# Patient Record
(demographics unavailable — no encounter records)

---

## 2024-12-04 NOTE — HISTORY OF PRESENT ILLNESS
[de-identified] : Kamala Orellana is a 67-year-old F who presents for evaluation of possible renal cancer discovered on imaging during hospitalization at [facility]. At discharge, she was anemic with HGB=8.3 g/dL.  At time of initial consult, she states she feels unchanged since discharge, with continued fatigue and nausea. Reports that she has Zofran for her nausea, which is providing relief. Denies any pain or further weight loss.  Following up w/ Dr. Wayne next week and will be scheduling an appointment w/ Dr. Isak powell.  Employed as  at [employer].   11/25/24 US Abd: 1. Suspicious LEFT renal, LEFT pelvic and hepatic lesions. Metastatic malignancy suspected. Primary evaluation with CT recommended 2. Nonocclusive IVC thrombus  11/25/24 CT C/A/P: 1. Large superior exophytic mid/upper pole LEFT renal malignancy with nonocclusive LEFT renal vein/IVC thrombus. 2. Hepatic and pulmonary lesions are most suspicious for metastasis 3. Bilateral large ovarian cystic lesions, measure up to 16 cm. Benign ovarian neoplasm cannot be distinguished from malignant ovarian neoplasm 4. Giant hepatic cyst  11/25/24 Transvaginal US:  - Cyst in the midline of the pelvis measuring 16.7 cm with areas of nodularity along the periphery of the cyst and along an internal septation consistent with an ovarian neoplasm. - 13.6 cm in the right adnexa with an internal septation and debris, also consistent with a neoplasm. - Uterus not seen. - A pre and post IV contrast MRI of the pelvis could be obtained for further evaluation.  11/26/24 MR Pelvis: Large bilateral adnexal cystic masses with mural nodularity, suspicious for malignant cystic ovarian neoplasms.  11/29/24 Surgical Pathology: 1) Liver mass core biopsy: - Small, single fragment of highly vascular tissue with numerous thin-walled capillaries, possibly compatible with the clinical suspicion of hemangioma in the clinical setting of a mass and the intraoperative finding of abundant blood on smears. - Separate fragments of benign, unremarkable liver and fibroconnective tissue. - No epithelial malignancy identified in sampled tissue. *Note: Reticulin stain with adequate control shows liver tissue with an intact reticulin network.

## 2024-12-04 NOTE — HISTORY OF PRESENT ILLNESS
[de-identified] : Kamala Orellana is a 67-year-old F who presents for evaluation of possible renal cancer discovered on imaging during hospitalization at [facility]. At discharge, she was anemic with HGB=8.3 g/dL.  At time of initial consult, she states she feels unchanged since discharge, with continued fatigue and nausea. Reports that she has Zofran for her nausea, which is providing relief. Denies any pain or further weight loss.  Following up w/ Dr. Wayne next week and will be scheduling an appointment w/ Dr. Isak powell.  Employed as  at [employer].   11/25/24 US Abd: 1. Suspicious LEFT renal, LEFT pelvic and hepatic lesions. Metastatic malignancy suspected. Primary evaluation with CT recommended 2. Nonocclusive IVC thrombus  11/25/24 CT C/A/P: 1. Large superior exophytic mid/upper pole LEFT renal malignancy with nonocclusive LEFT renal vein/IVC thrombus. 2. Hepatic and pulmonary lesions are most suspicious for metastasis 3. Bilateral large ovarian cystic lesions, measure up to 16 cm. Benign ovarian neoplasm cannot be distinguished from malignant ovarian neoplasm 4. Giant hepatic cyst  11/25/24 Transvaginal US:  - Cyst in the midline of the pelvis measuring 16.7 cm with areas of nodularity along the periphery of the cyst and along an internal septation consistent with an ovarian neoplasm. - 13.6 cm in the right adnexa with an internal septation and debris, also consistent with a neoplasm. - Uterus not seen. - A pre and post IV contrast MRI of the pelvis could be obtained for further evaluation.  11/26/24 MR Pelvis: Large bilateral adnexal cystic masses with mural nodularity, suspicious for malignant cystic ovarian neoplasms.  11/29/24 Surgical Pathology: 1) Liver mass core biopsy: - Small, single fragment of highly vascular tissue with numerous thin-walled capillaries, possibly compatible with the clinical suspicion of hemangioma in the clinical setting of a mass and the intraoperative finding of abundant blood on smears. - Separate fragments of benign, unremarkable liver and fibroconnective tissue. - No epithelial malignancy identified in sampled tissue. *Note: Reticulin stain with adequate control shows liver tissue with an intact reticulin network.

## 2024-12-04 NOTE — ADDENDUM
[FreeTextEntry1] : All medical record entries made by the Scribe were at my, Dr. Norbert Nelson, direction and personally dictated by me on 12/03/2024. I have reviewed the chart and agree that the record accurately reflects my personal performance of the history, physical exam, assessment and plan. I have also personally directed, reviewed, and agreed with the chart.   I, Chandu Curtis, documented this note as a scribe on behalf of Dr. Norbert Nelson on 12/03/2024.

## 2024-12-04 NOTE — RESULTS/DATA
[FreeTextEntry1] : Kamala Orellana is a 67-year-old F who presents for evaluation of possible renal cancer discovered on imaging during hospitalization at [facility]

## 2024-12-04 NOTE — PHYSICAL EXAM
[Normal] : affect appropriate [Fully active, able to carry on all pre-disease performance without restriction] : Status 0 - Fully active, able to carry on all pre-disease performance without restriction [de-identified] : anicteric [de-identified] : distended abdomen

## 2024-12-04 NOTE — PHYSICAL EXAM
[Normal] : affect appropriate [Fully active, able to carry on all pre-disease performance without restriction] : Status 0 - Fully active, able to carry on all pre-disease performance without restriction [de-identified] : anicteric [de-identified] : distended abdomen

## 2024-12-18 NOTE — ASSESSMENT
[FreeTextEntry1] : Discussed in Tumor Board as well (12/11/24):  plan:  MRI abdomen w wo Gado - extent of thrombus / involvement of renal vein/IVBC -- [place] vs tumor thrombus Will discuss plan with Dr Parisi - Gyne Onc Will need joint surgery with Dr Wakefield at  continue Eliquis 5 mg BID  referrals sent

## 2024-12-18 NOTE — HISTORY OF PRESENT ILLNESS
[FreeTextEntry1] : Hosp consult follow-up:  HPI 67 year old postmenopausal female with PMH HLD, x3  sections, and right ankle ORIF surgery comes in after outpatient US ordered by her PCP showed IVC thrombus and was sent to the ED. She states for the past few months she has been having fatigue, unexpected 20-22 lb weight loss, constant and mild nausea, decreased appetite, and excessive belching for the past 6-7 weeks, so her PCP sent her for outpatient US. Denies dyspnea, fever, night sweats, easy bleeding/bruising, abdominal pain, hematochezia/melena, urinary frequency/urgency, hematuria, CVA tenderness, or abnormal uterine bleeding  PET FDG scan:  Findings consistent with left renal malignancy and left renal vein thrombus. Multiple bilateral FDG avid lung nodules, concerning for metastases. Large cystic pelvic lesions, better characterized on MR, one with mild peripheral FDG avidity. Excreted urine in the urinary bladder surrounding the cystic masses, with unusual configurations, as above. MR provides most complete anatomic detail in these regions. No FDG avid liver lesions.   Discussed PET with patient and Dr sarmiento -- due to small size of lung nodules - will hold off on lung biopsy

## 2024-12-18 NOTE — ASSESSMENT
[FreeTextEntry1] : Discussed in Tumor Board as well (12/11/24):  plan:  MRI abdomen w wo Gado - extent of thrombus / involvement of renal vein/IVBC -- [place] vs tumor thrombus Will discuss plan with Dr Parisi - Gyne Onc Will need joint surgery with Dr Wakeifeld at  continue Eliquis 5 mg BID  referrals sent

## 2024-12-30 NOTE — HISTORY OF PRESENT ILLNESS
[FreeTextEntry1] : 66 yo female presenting with abdominal pain and found to have a large left renal mass (14cm) with left renal vein thrombus along with FDG avid lung nodules and large pelvic cystic lesions concerning for malignancy on ultrasound.  She has seen Dr. Parisi (gyn-onc in [place]) plans to do a pelvic mass removal/hysterectomy after nephrectomy is performed. Patient presented with pain around thanksgiving and was seen by Dr Wayne.    her case was presented in tumor board.   She has seen a gyn onc but the doctor is out of the [facility] system Here for opinion about a radical nephrectomy

## 2024-12-30 NOTE — ASSESSMENT
[FreeTextEntry1] : I have personally reviewed extensively all images including the original CT scan, PET scan and the recent MRI She has possible lung mets on the PET scan and also her renal vein thrombus extends to the IVC, the full length of the vein but is not in the IVC.   The ovarian lesions are very large and cystic occupying the pelvic region and has been told by GYN ONc that she needs a radical hysterectomy for suspected malignancy.    I have not had the opportunity to review her notes as the doctor is not a [facility] doctor.  I had a long discussion with the patient and her .   I believe that her care should be at [facility] in a multidisciplinary fashion with GYN ONC, Urology and possible vascular surgery.   I have spoken to Dr Abel whose team will contact the patient for expedited care.    There is a possibility that the procedures can be done at the same time if her renal operation goes uneventfully.     But she needs this care delivered at a tertiary care center in my judgement.  Referral arranged

## 2025-01-08 NOTE — PHYSICAL EXAM
[Fully active, able to carry on all pre-disease performance without restriction] : Status 0 - Fully active, able to carry on all pre-disease performance without restriction [Normal] : affect appropriate [de-identified] : anicteric [de-identified] : distended abdomen

## 2025-01-08 NOTE — ADDENDUM
[FreeTextEntry1] : All medical record entries made by the Scribe were at my, Dr. Norbert Nelson, direction and personally dictated by me on 01/08/2025. I have reviewed the chart and agree that the record accurately reflects my personal performance of the history, physical exam, assessment and plan. I have also personally directed, reviewed, and agreed with the chart.   I, Chandu Curtis, documented this note as a scribe on behalf of Dr. Norbert Nelson on 01/08/2025.

## 2025-01-08 NOTE — HISTORY OF PRESENT ILLNESS
[de-identified] : Kamala Orellana is a 67-year-old F who presents for evaluation of possible renal cancer discovered on imaging during hospitalization at [facility]. At discharge, she was anemic with HGB=8.3 g/dL.  At time of initial consult, she states she feels unchanged since discharge, with continued fatigue and nausea. Reports that she has Zofran for her nausea, which is providing relief. Denies any pain or further weight loss.  Following up w/ Dr. Wayne next week and will be scheduling an appointment w/ Dr. Isak powell.  Employed as  at [employer].   11/25/24 US Abd: 1. Suspicious LEFT renal, LEFT pelvic and hepatic lesions. Metastatic malignancy suspected. Primary evaluation with CT recommended 2. Nonocclusive IVC thrombus  11/25/24 CT C/A/P: 1. Large superior exophytic mid/upper pole LEFT renal malignancy with nonocclusive LEFT renal vein/IVC thrombus. 2. Hepatic and pulmonary lesions are most suspicious for metastasis 3. Bilateral large ovarian cystic lesions, measure up to 16 cm. Benign ovarian neoplasm cannot be distinguished from malignant ovarian neoplasm 4. Giant hepatic cyst  11/25/24 Transvaginal US:  - Cyst in the midline of the pelvis measuring 16.7 cm with areas of nodularity along the periphery of the cyst and along an internal septation consistent with an ovarian neoplasm. - 13.6 cm in the right adnexa with an internal septation and debris, also consistent with a neoplasm. - Uterus not seen. - A pre and post IV contrast MRI of the pelvis could be obtained for further evaluation.  11/26/24 MR Pelvis: Large bilateral adnexal cystic masses with mural nodularity, suspicious for malignant cystic ovarian neoplasms.  11/29/24 Surgical Pathology: 1) Liver mass core biopsy: - Small, single fragment of highly vascular tissue with numerous thin-walled capillaries, possibly compatible with the clinical suspicion of hemangioma in the clinical setting of a mass and the intraoperative finding of abundant blood on smears. - Separate fragments of benign, unremarkable liver and fibroconnective tissue. - No epithelial malignancy identified in sampled tissue. *Note: Reticulin stain with adequate control shows liver tissue with an intact reticulin network. [de-identified] : She returns today for a follow up. Scheduled for joint surgery performed by Dr. Abel and Dr. Sherwood at [facility] on 1/16/25:  Left Radical Nephrectomy and radical hysterectomy. Now on Eliquis or L renal vein thrombus as well. Denies any recent or active bleeding, including blood in stool.  12/10/25 PET/CT: 1. Findings consistent with left renal malignancy and left renal vein thrombus. 2. Multiple bilateral FDG avid lung nodules, concerning for metastases. 3. Large cystic pelvic lesions, better characterized on MR, one with mild peripheral FDG avidity. Excreted urine in the urinary bladder surrounding the cystic masses, with unusual configurations, as above. MR provides most complete anatomic detail in these regions. 4. No FDG avid liver lesions. There is mildly FDG avid perihepatic soft tissue along the inferior right hepatic lobe, which may be due to recent biopsy. Recommend attention on follow-up. 5. Two foci of abnormal FDG avidity, one associated with the left maxilla and one in the right mandible. Recommend dental evaluation.  12/23/24 MR Abdomen: - Extensive left upper lobe renal mass as previously demonstrated with occlusive thrombus renal vein extending to the inferior vena cava. No intracaval thrombus identified. - New small ascites since 11/25/2024. *** ADDENDUM # 1 *** Addendum: The left renal vein thrombus is partially bland and partially malignant. *** END OF ADDENDUM # 1 ***

## 2025-01-13 NOTE — OB HISTORY
[Total Preg ___] : : [unfilled] [Full Term ___] : [unfilled] (full-term) [Living ___] : [unfilled] (living) [ ___] : [unfilled]  section delivery(s) [Approximately ___ (Month)] : the LMP was approximately [unfilled] month(s) ago [Menarche Age ____] : age at menarche was [unfilled] [Menopause  Age ____] : menopause occurred at age [unfilled]

## 2025-01-14 NOTE — DISCUSSION/SUMMARY
[Reviewed Clinical Lab Test(s)] : Results of clinical tests were reviewed. [Reviewed Radiology Report(s)] : Radiology reports were reviewed. [FreeTextEntry1] : I met with the pt and her . -We discussed the clinical findings and the differential diagnosis of her findings, including primary gynecologic neoplasia versus metastatic disease versus multiple underlying diagnoses.  We reviewed her examination as well as the imaging reports in detail. -She confirms the absence of vaginal bleeding, and we noted the pelvic MRI had an endometrium that did not appear thickened.  She will have Dr Parisi's sonogram report forwarded to me, along with the Pap test results and prior Cologuard.  She notes not having previously undergone colonoscopy though she thinks prior providers may have discussed this with her.  She notes no rectal bleeding or melena.  She also notes no change in her bowel caliber.  She notes no urinary incontinence. -Management options were reviewed, including my advice for exploration and excision with possible staging.  We discussed my recommendation for an open procedure through a vertical incision with BSO.  The potential need for hysterectomy versus uterine sampling with a D&C was discussed, as was its rationale.  Potential need for staging was discussed.  Possibility of other indicated procedures including mobilization of adhesions and even bowel surgery was reviewed, particularly in the context of her prior surgery (noting the prior bladder injury at ).  We discussed the rationale for bowel preparation. -Periop and recuperative issues were discussed, as were the possible hazards and risks of surgery, including but not limited to infection, thromboembolic disease and its life-threatening nature, transfusion, and surrounding organ injury (urinary, bowel, vascular, and neural), incision issues. -A diagram was drawn, and a copy provided. -I advised she check in with her primary care physician, who I tasked. -I advised a bowel prep ([facility] per pt). CT1 to place orders.  -She will have Dr. Parisi's records forwarded to me. -Her instructions were reviewed. -All Q/A. -She will call with any additional questions/concerns.  -KG tasked.  -I spoke with SH re my eval and recs.  -Effort for the visit includes the note prep, review of prior material, interview, exam, further documentation, and coordination of care.

## 2025-01-14 NOTE — PHYSICAL EXAM
[Chaperone Present] : A chaperone was present in the examining room during all aspects of the physical examination [90729] : A chaperone was present during the pelvic exam. [Absent] : CVAT: absent [Abnormal] : Bimanual Exam: Abnormal [Normal] : Recto-Vaginal Exam: Normal [FreeTextEntry2] : DC [de-identified] : 1+ edema [de-identified] : Pfan inc; No peritoneal  signs [de-identified] : Fullness throughout abd [de-identified] : No palp abnlity; deviated under pubis by CDS mass [de-identified] : The uterus was nonpalpable [de-identified] : The adnexae were filling the pelvis into the abdomen [de-identified] : There was a CDS mass

## 2025-01-14 NOTE — HISTORY OF PRESENT ILLNESS
[FreeTextEntry1] : Referred by Dr. Abel Uro: Dr. Abel GYN Onc: Dr. Parisi Heme Onc: Dr. Nelson Ortho: Dr. Sommer PCP: Dr. OLVIN White   Ms. FLOYD is a 67 year old  female LMP , referred from Dr. Abel for further evaluation and management of pelvic mass. She is scheduled for joint surgery performed by Dr. Abel and Dr. Sherwood at [facility] on 25: Left Radical Nephrectomy and ex lap BSO, poss Hyst. [place] been seen in [place], [facility] and resection planned. Of note, liver bx bgn.   24 MR Abdomen: - Extensive left upper lobe renal mass as previously demonstrated with occlusive thrombus renal vein extending to the inferior vena cava. No intra-caval thrombus identified. - New small ascites since 2024.  12/10/24 PET FDG scan  Findings consistent with left renal malignancy and left renal vein thrombus.  Multiple bilateral FDG avid lung nodules, concerning for metastases. Large cystic pelvic lesions, better characterized on MR, one with mild peripheral FDG avidity. Excreted urine in the urinary bladder surrounding the cystic masses, with unusual configurations, as above.  MR provides most complete anatomic detail in these regions. No FDG avid liver lesions.  24 MR Pelvis: Large bilateral adnexal cystic masses with mural nodularity, suspicious for malignant cystic ovarian neoplasms.  24 US Abd: 1. Suspicious LEFT renal, LEFT pelvic and hepatic lesions. Metastatic malignancy suspected. Primary evaluation with CT recommended 2. Nonocclusive IVC thrombus  24 CT C/A/P: 1. Large superior exophytic mid/upper pole LEFT renal malignancy with nonocclusive LEFT renal vein/IVC thrombus. 2. Hepatic and pulmonary lesions are most suspicious for metastasis 3. Bilateral large ovarian cystic lesions, measure up to 16 cm. Benign ovarian neoplasm cannot be distinguished from malignant ovarian neoplasm 4. Giant hepatic cyst  24 Transvaginal US: - Cyst in the midline of the pelvis measuring 16.7 cm with areas of nodularity along the periphery of the cyst and along an internal septation consistent with an ovarian neoplasm. - 13.6 cm in the right adnexa with an internal septation and debris, also consistent with a neoplasm. - Uterus not seen.  PMHx- HTN, HLD, former smoker, pulmonary embolism PSHx-  x3 (her  said there was a bladder injury and repair at last C/S); ankle surgery Pt reports no family hx of cancer   HM Pap- 2024 Mammo- 2024 Colonoscopy- Per pt, she has not yet had colonoscopy screening. Reports Cologuard screening a few years ago.

## 2025-01-14 NOTE — PHYSICAL EXAM
[Chaperone Present] : A chaperone was present in the examining room during all aspects of the physical examination [71545] : A chaperone was present during the pelvic exam. [Absent] : CVAT: absent [Abnormal] : Bimanual Exam: Abnormal [Normal] : Recto-Vaginal Exam: Normal [FreeTextEntry2] : DC [de-identified] : 1+ edema [de-identified] : Pfan inc; No peritoneal  signs [de-identified] : Fullness throughout abd [de-identified] : No palp abnlity; deviated under pubis by CDS mass [de-identified] : The uterus was nonpalpable [de-identified] : The adnexae were filling the pelvis into the abdomen [de-identified] : There was a CDS mass

## 2025-01-14 NOTE — HISTORY OF PRESENT ILLNESS
[FreeTextEntry1] : Referred by Dr. Abel Uro: Dr. Abel GYN Onc: Dr. Parisi Heme Onc: Dr. Nelson Ortho: Dr. Sommer PCP: Dr. OLVIN White   Ms. FLOYD is a 67 year old  female LMP , referred from Dr. Abel for further evaluation and management of pelvic mass. She is scheduled for joint surgery performed by Dr. Abel and Dr. Sherwood at [facility] on 25: Left Radical Nephrectomy and ex lap BSO, poss Hyst. She had been seen in [place], [facility] and resection planned. Of note, liver bx bgn.   24 MR Abdomen: - Extensive left upper lobe renal mass as previously demonstrated with occlusive thrombus renal vein extending to the inferior vena cava. No intra-caval thrombus identified. - New small ascites since 2024.  12/10/24 PET FDG scan  Findings consistent with left renal malignancy and left renal vein thrombus.  Multiple bilateral FDG avid lung nodules, concerning for metastases. Large cystic pelvic lesions, better characterized on MR, one with mild peripheral FDG avidity. Excreted urine in the urinary bladder surrounding the cystic masses, with unusual configurations, as above.  MR provides most complete anatomic detail in these regions. No FDG avid liver lesions.  24 MR Pelvis: Large bilateral adnexal cystic masses with mural nodularity, suspicious for malignant cystic ovarian neoplasms.  24 US Abd: 1. Suspicious LEFT renal, LEFT pelvic and hepatic lesions. Metastatic malignancy suspected. Primary evaluation with CT recommended 2. Nonocclusive IVC thrombus  24 CT C/A/P: 1. Large superior exophytic mid/upper pole LEFT renal malignancy with nonocclusive LEFT renal vein/IVC thrombus. 2. Hepatic and pulmonary lesions are most suspicious for metastasis 3. Bilateral large ovarian cystic lesions, measure up to 16 cm. Benign ovarian neoplasm cannot be distinguished from malignant ovarian neoplasm 4. Giant hepatic cyst  24 Transvaginal US: - Cyst in the midline of the pelvis measuring 16.7 cm with areas of nodularity along the periphery of the cyst and along an internal septation consistent with an ovarian neoplasm. - 13.6 cm in the right adnexa with an internal septation and debris, also consistent with a neoplasm. - Uterus not seen.  PMHx- HTN, HLD, former smoker, pulmonary embolism PSHx-  x3 (her  said there was a bladder injury and repair at last C/S); ankle surgery Pt reports no family hx of cancer   HM Pap- 2024 Mammo- 2024 Colonoscopy- Per pt, she has not yet had colonoscopy screening. Reports Cologuard screening a few years ago.

## 2025-02-05 NOTE — HISTORY OF PRESENT ILLNESS
[FreeTextEntry1] : 68 yo female presenting with abdominal pain and found to have a large left renal mass (14cm) with left renal vein thrombus along with FDG avid lung nodules and large pelvic cystic lesions concerning for malignancy on ultrasound.  She has seen Dr. Parisi (gyn-onc in [place]) plans to do a pelvic mass removal/hysterectomy after nephrectomy is performed. Patient presented with pain around thanksgiving and was seen by Dr Wayne.    her case was presented in tumor board.   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

## 2025-02-05 NOTE — ASSESSMENT
[FreeTextEntry1] : staples removed. reviewed pathology and candidate for adjuvant immune therapy - [place] expect [place] will need therapy for the ovaian cancer in addition

## 2025-02-08 NOTE — DISCUSSION/SUMMARY
[Clean] : was clean [Dry] : was dry [Intact] : was intact [Erythema] : was not erythematous [Ecchymosis] : was not ecchymotic [None] : had no drainage [Normal Skin] : normal appearance [Doing Well] : is doing well [FreeTextEntry9] : s/nt/nd [de-identified] : Vag/Cx neg

## 2025-02-08 NOTE — LETTER BODY
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 The tumors were diffusely adherent in the pelvis, but no gross residual remained.   She is recuperated well. We have discussed the plan for surveillance. In the context of her and her twin sister's inquiry regarding hereditary predisposition, I provided the contact for our genetics team at the [facility], noting that LMP lesions are not typically associated with this phenomenon.   I will see her again in 3 months, sooner as needed.

## 2025-02-08 NOTE — REASON FOR VISIT
[Post Op] : post op visit [de-identified] : 1/16/25 [de-identified] : NORA, BSO, Staging; Left rad nephrectomy [de-identified] : Twin sister present throughout). She reports feeling well but noting fatigue. Saw SH. No VB. GI and  fxn ok. No fevers.

## 2025-02-08 NOTE — REASON FOR VISIT
[Post Op] : post op visit [de-identified] : 1/16/25 [de-identified] : NORA, BSO, Staging; Left rad nephrectomy [de-identified] : Twin sister present throughout). She reports feeling well but noting fatigue. Saw SH. No VB. GI and  fxn ok. No fevers.

## 2025-02-08 NOTE — ASSESSMENT
[FreeTextEntry1] : I met with the pt and her sister. She is recuperating well and we discussed her path, a copy was provided to her. We disc the likely rec for exp mgmt/surv, noting SH has disc immune therapy (and it's poss benefit [?] on her Gyn condition); await TB recs. We disc the question of hereditary predispo which is usu not a concern for LMP lesions, but in the context of her inquiry, provided the Genetics contact at [facility]. Her instructions were discussed. All Q/A. She'll RTO in 3m, sooner prn. She'll check in in a couple of weeks as well.

## 2025-02-08 NOTE — ASSESSMENT
[FreeTextEntry1] : I met with the pt and her sister. She is recuperating well and we discussed her path, a copy was provided to her. We disc the likely rec for exp mgmt/surv, noting SH has disc immune therapy (and it's poss benefit [?] on her Gyn condition); await TB recs. We disc the question of hereditary predispo which is usu not a concern for LMP lesions, but in the context of her inquiry, provided the Genetics contact at [facility]. Her instructions were discussed. All Q/A. She'll RTO in 3m, sooner prn. She'll check in in a couple of weeks as well.

## 2025-02-08 NOTE — LETTER BODY
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 The tumors were diffusely adherent in the pelvis, but no gross residual remained.   She is recuperated well. We have discussed the plan for surveillance. In the context of her and her twin sister's inquiry regarding hereditary predisposition, I provided the contact for our genetics team at the [facility], noting that LMP lesions are not typically associated with this phenomenon.   I will see her again in 3 months, sooner as needed.

## 2025-03-01 NOTE — HISTORY OF PRESENT ILLNESS
[de-identified] : Kamala Orellana is a 67-year-old F who presents for evaluation of possible renal cancer discovered on imaging during hospitalization at [facility] [place]. At discharge, she was anemic with HGB=8.3 g/dL.  At time of initial consult, she states she feels unchanged since discharge, with continued fatigue and nausea. Reports that she has Zofran for her nausea, which is providing relief. Denies any pain or further weight loss.  Following up w/ Dr. Wayne next week and will be scheduling an appointment w/ Dr. Isak powell.  Employed as  at [employer].   11/25/24 US Abd: 1. Suspicious LEFT renal, LEFT pelvic and hepatic lesions. Metastatic malignancy suspected. Primary evaluation with CT recommended 2. Nonocclusive IVC thrombus  11/25/24 CT C/A/P: 1. Large superior exophytic mid/upper pole LEFT renal malignancy with nonocclusive LEFT renal vein/IVC thrombus. 2. Hepatic and pulmonary lesions are most suspicious for metastasis 3. Bilateral large ovarian cystic lesions, measure up to 16 cm. Benign ovarian neoplasm cannot be distinguished from malignant ovarian neoplasm 4. Giant hepatic cyst  11/25/24 Transvaginal US:  - Cyst in the midline of the pelvis measuring 16.7 cm with areas of nodularity along the periphery of the cyst and along an internal septation consistent with an ovarian neoplasm. - 13.6 cm in the right adnexa with an internal septation and debris, also consistent with a neoplasm. - Uterus not seen. - A pre and post IV contrast MRI of the pelvis could be obtained for further evaluation.  11/26/24 MR Pelvis: Large bilateral adnexal cystic masses with mural nodularity, suspicious for malignant cystic ovarian neoplasms.  11/29/24 Surgical Pathology: 1) Liver mass core biopsy: - Small, single fragment of highly vascular tissue with numerous thin-walled capillaries, possibly compatible with the clinical suspicion of hemangioma in the clinical setting of a mass and the intraoperative finding of abundant blood on smears. - Separate fragments of benign, unremarkable liver and fibroconnective tissue. - No epithelial malignancy identified in sampled tissue. *Note: Reticulin stain with adequate control shows liver tissue with an intact reticulin network. [de-identified] : She returns today for a follow up, s/p Left Radical Nephrectomy and radical hysterectomy with upper cervical excision and right salpingo-oophorectomy on 1/16/25.  Reports that she is recovering from surgery well with reduced pain, though some weakness, SOB, and fatigue. Notes receiving several blood transfusions during operation and post-operatively as well due to blood loss. Denies active blood loss. States she stopped Eliquis prior to surgery and did not restart. Endorses improved appetite, some constipation. Following w/ Dr. Sherwood q3 months to monitor her newly diagnosed ovarian serous borderline tumor, also following w/ Dr. Abel for renal cancer.   1/16/25 Pathology: 1.  Left adnexal mass, salpingo-oophorectomy - Serous borderline tumor, ovary  - Surface not involved by tumor - Unremarkable fimbriated fallopian tube - AJCC Stage: pT1c3, pNx, pMx  2.  Uterus, upper cervix, right adnexa; hysterectomy with upper cervical excision and right salpingo-oophorectomy - Serous borderline tumor, ovary - Ovarian surface not involved by tumor - Atrophic endometrium - Leiomyoma, uterus - Unremarkable cervix and fimbriated fallopian tube - AJCC Stage: pT1c3, pNx, pMx  3. Adherent appendix, appendectomy - Appendix with fibrous obliteration of lumen - Serosal surface adhesions and focal psammomatous calcification, negative for carcinoma  4. 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  ### Addendum: An immunohistochemical stain for BAP-1 shows retained nuclear expression in neoplastic cells.  5. Left periaortic lymph node, excision - One lymph node, negative for carcinoma (0/1)  6. Left pelvic lymph node package, excision - One lymph node, negative for carcinoma (0/1)  7. Omentum, excision - Benign fibroadipose tissue  1/16/25 Pathology, Peritoneal Fluid: - POSITIVE FOR MALIGNANT CELLS - Compatible with serous borderline tumor of ovary

## 2025-03-01 NOTE — RESULTS/DATA
[FreeTextEntry1] : Kamala Orellana is a 67-year-old F who presents for evaluation of renal cancer, serous tumor of ovary discovered on imaging during hospitalization at [facility]

## 2025-03-01 NOTE — PHYSICAL EXAM
[Fully active, able to carry on all pre-disease performance without restriction] : Status 0 - Fully active, able to carry on all pre-disease performance without restriction [Normal] : affect appropriate [de-identified] : anicteric [de-identified] : well healed surgical wound

## 2025-03-01 NOTE — ADDENDUM
[FreeTextEntry1] : All medical record entries made by the Scribe were at my, Dr. Norbert Nelson, direction and personally dictated by me on 02/26/2025. I have reviewed the chart and agree that the record accurately reflects my personal performance of the history, physical exam, assessment and plan. I have also personally directed, reviewed, and agreed with the chart.   I, Chandu Curtis, documented this note as a scribe on behalf of Dr. Norbert Nelson on 02/26/2025.

## 2025-03-01 NOTE — HISTORY OF PRESENT ILLNESS
[de-identified] : Kamala Orellana is a 67-year-old F who presents for evaluation of possible renal cancer discovered on imaging [place] [facility] [place] At discharge, she was anemic with HGB=8.3 g/dL.  At time of initial consult, she states she feels unchanged since discharge, with continued fatigue and nausea. Reports that she has Zofran for her nausea, which is providing relief. Denies any pain or further weight loss.  Following up w/ Dr. Wayne next week and will be scheduling an appointment w/ Dr. Isak powell.  Employed as  at [employer].   11/25/24 US Abd: 1. Suspicious LEFT renal, LEFT pelvic and hepatic lesions. Metastatic malignancy suspected. Primary evaluation with CT recommended 2. Nonocclusive IVC thrombus  11/25/24 CT C/A/P: 1. Large superior exophytic mid/upper pole LEFT renal malignancy with nonocclusive LEFT renal vein/IVC thrombus. 2. Hepatic and pulmonary lesions are most suspicious for metastasis 3. Bilateral large ovarian cystic lesions, measure up to 16 cm. Benign ovarian neoplasm cannot be distinguished from malignant ovarian neoplasm 4. Giant hepatic cyst  11/25/24 Transvaginal US:  - Cyst in the midline of the pelvis measuring 16.7 cm with areas of nodularity along the periphery of the cyst and along an internal septation consistent with an ovarian neoplasm. - 13.6 cm in the right adnexa with an internal septation and debris, also consistent with a neoplasm. - Uterus not seen. - A pre and post IV contrast MRI of the pelvis could be obtained for further evaluation.  11/26/24 MR Pelvis: Large bilateral adnexal cystic masses with mural nodularity, suspicious for malignant cystic ovarian neoplasms.  11/29/24 Surgical Pathology: 1) Liver mass core biopsy: - Small, single fragment of highly vascular tissue with numerous thin-walled capillaries, possibly compatible with the clinical suspicion of hemangioma in the clinical setting of a mass and the intraoperative finding of abundant blood on smears. - Separate fragments of benign, unremarkable liver and fibroconnective tissue. - No epithelial malignancy identified in sampled tissue. *Note: Reticulin stain with adequate control shows liver tissue with an intact reticulin network. [de-identified] : She returns today for a follow up, s/p Left Radical Nephrectomy and radical hysterectomy with upper cervical excision and right salpingo-oophorectomy on 1/16/25.  Reports that she is recovering from surgery well with reduced pain, though some weakness, SOB, and fatigue. Notes receiving several blood transfusions during operation and post-operatively as well due to blood loss. Denies active blood loss. States she stopped Eliquis prior to surgery and did not restart. Endorses improved appetite, some constipation. Following w/ Dr. Sherwood q3 months to monitor her newly diagnosed ovarian serous borderline tumor, also following w/ Dr. Abel for renal cancer.   1/16/25 Pathology: 1.  Left adnexal mass, salpingo-oophorectomy - Serous borderline tumor, ovary  - Surface not involved by tumor - Unremarkable fimbriated fallopian tube - AJCC Stage: pT1c3, pNx, pMx  2.  Uterus, upper cervix, right adnexa; hysterectomy with upper cervical excision and right salpingo-oophorectomy - Serous borderline tumor, ovary - Ovarian surface not involved by tumor - Atrophic endometrium - Leiomyoma, uterus - Unremarkable cervix and fimbriated fallopian tube - AJCC Stage: pT1c3, pNx, pMx  3. Adherent appendix, appendectomy - Appendix with fibrous obliteration of lumen - Serosal surface adhesions and focal psammomatous calcification, negative for carcinoma  4. 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  ### Addendum: An immunohistochemical stain for BAP-1 shows retained nuclear expression in neoplastic cells.  5. Left periaortic lymph node, excision - One lymph node, negative for carcinoma (0/1)  6. Left pelvic lymph node package, excision - One lymph node, negative for carcinoma (0/1)  7. Omentum, excision - Benign fibroadipose tissue  1/16/25 Pathology, Peritoneal Fluid: - POSITIVE FOR MALIGNANT CELLS - Compatible with serous borderline tumor of ovary

## 2025-03-01 NOTE — REVIEW OF SYSTEMS
[Fatigue] : fatigue [Diarrhea: Grade 0] : Diarrhea: Grade 0 [Shortness Of Breath] : shortness of breath [SOB on Exertion] : shortness of breath during exertion [Constipation] : constipation [Recent Change In Weight] : ~T no recent weight change [Nosebleeds] : no nosebleeds [Lower Ext Edema] : no lower extremity edema [Vomiting] : no vomiting [Dysuria] : no dysuria [Skin Rash] : no skin rash [Dizziness] : no dizziness [Fainting] : no fainting [Easy Bleeding] : no tendency for easy bleeding [Easy Bruising] : no tendency for easy bruising

## 2025-03-01 NOTE — RESULTS/DATA
Patient is asking to be referred to a new urologist. She said the current wait is until September with current Urologist. I did inform her that unfortunately this is probably about the average wait time to see a specialist. Patient is still asking for a new referral    [FreeTextEntry1] : Kamala Orellana is a 67-year-old F who presents for evaluation of renal cancer, serous tumor of ovary discovered on imaging during hospitalization at [facility]

## 2025-03-01 NOTE — PHYSICAL EXAM
[Fully active, able to carry on all pre-disease performance without restriction] : Status 0 - Fully active, able to carry on all pre-disease performance without restriction [Normal] : affect appropriate [de-identified] : anicteric [de-identified] : well healed surgical wound

## 2025-04-25 NOTE — REVIEW OF SYSTEMS
[Fatigue] : fatigue [Constipation] : constipation [Diarrhea: Grade 0] : Diarrhea: Grade 0 [Recent Change In Weight] : ~T no recent weight change [Nosebleeds] : no nosebleeds [Lower Ext Edema] : no lower extremity edema [Shortness Of Breath] : no shortness of breath [SOB on Exertion] : no shortness of breath during exertion [Vomiting] : no vomiting [Dysuria] : no dysuria [Skin Rash] : no skin rash [Dizziness] : no dizziness [Fainting] : no fainting [Easy Bleeding] : no tendency for easy bleeding [Easy Bruising] : no tendency for easy bruising

## 2025-04-25 NOTE — HISTORY OF PRESENT ILLNESS
[de-identified] : Kamala Orellana is a 67-year-old F who presents for evaluation of possible renal cancer discovered on imaging during hospitalization at [facility] [place]. At discharge, she was anemic with HGB=8.3 g/dL.  At time of initial consult, she states she feels unchanged since discharge, with continued fatigue and nausea. Reports that she has Zofran for her nausea, which is providing relief. Denies any pain or further weight loss.  Following up w/ Dr. Wayne next week and will be scheduling an appointment w/ Dr. Isak powell.  Employed as  at [employer].   11/25/24 US Abd: 1. Suspicious LEFT renal, LEFT pelvic and hepatic lesions. Metastatic malignancy suspected. Primary evaluation with CT recommended 2. Nonocclusive IVC thrombus  11/25/24 CT C/A/P: 1. Large superior exophytic mid/upper pole LEFT renal malignancy with nonocclusive LEFT renal vein/IVC thrombus. 2. Hepatic and pulmonary lesions are most suspicious for metastasis 3. Bilateral large ovarian cystic lesions, measure up to 16 cm. Benign ovarian neoplasm cannot be distinguished from malignant ovarian neoplasm 4. Giant hepatic cyst  11/25/24 Transvaginal US:  - Cyst in the midline of the pelvis measuring 16.7 cm with areas of nodularity along the periphery of the cyst and along an internal septation consistent with an ovarian neoplasm. - 13.6 cm in the right adnexa with an internal septation and debris, also consistent with a neoplasm. - Uterus not seen. - A pre and post IV contrast MRI of the pelvis could be obtained for further evaluation.  11/26/24 MR Pelvis: Large bilateral adnexal cystic masses with mural nodularity, suspicious for malignant cystic ovarian neoplasms.  11/29/24 Surgical Pathology: 1) Liver mass core biopsy: - Small, single fragment of highly vascular tissue with numerous thin-walled capillaries, possibly compatible with the clinical suspicion of hemangioma in the clinical setting of a mass and the intraoperative finding of abundant blood on smears. - Separate fragments of benign, unremarkable liver and fibroconnective tissue. - No epithelial malignancy identified in sampled tissue. *Note: Reticulin stain with adequate control shows liver tissue with an intact reticulin network.   s/p Left Radical Nephrectomy and radical hysterectomy with upper cervical excision and right salpingo-oophorectomy on 1/16/25. [de-identified] : 1/16/25 Pathology: 1.  Left adnexal mass, salpingo-oophorectomy - Serous borderline tumor, ovary  - Surface not involved by tumor - Unremarkable fimbriated fallopian tube - AJCC Stage: pT1c3, pNx, pMx  2.  Uterus, upper cervix, right adnexa; hysterectomy with upper cervical excision and right salpingo-oophorectomy - Serous borderline tumor, ovary - Ovarian surface not involved by tumor - Atrophic endometrium - Leiomyoma, uterus - Unremarkable cervix and fimbriated fallopian tube - AJCC Stage: pT1c3, pNx, pMx  3. Adherent appendix, appendectomy - Appendix with fibrous obliteration of lumen - Serosal surface adhesions and focal psammomatous calcification, negative for carcinoma  4. 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  ### Addendum: An immunohistochemical stain for BAP-1 shows retained nuclear expression in neoplastic cells.  5. Left periaortic lymph node, excision - One lymph node, negative for carcinoma (0/1)  6. Left pelvic lymph node package, excision - One lymph node, negative for carcinoma (0/1)  7. Omentum, excision - Benign fibroadipose tissue  1/16/25 Pathology, Peritoneal Fluid: - POSITIVE FOR MALIGNANT CELLS - Compatible with serous borderline tumor of ovary [de-identified] : Today is C3 Keytruda. Noting no issues thus far with administration. Denies SOB, diarrhea, abdominal pain, rash, aches or pains. Thyroid function has been WNL, though TSH is creeping upward.

## 2025-04-25 NOTE — PHYSICAL EXAM
[Fully active, able to carry on all pre-disease performance without restriction] : Status 0 - Fully active, able to carry on all pre-disease performance without restriction [Normal] : affect appropriate [de-identified] : anicteric [de-identified] : well healed surgical wound

## 2025-05-11 NOTE — PHYSICAL EXAM
[MA] : MA [FreeTextEntry2] : Latasha [Absent] : Adnexa(ae): Absent [Normal] : Recto-Vaginal Exam: Normal [de-[facility]

## 2025-05-11 NOTE — DISCUSSION/SUMMARY
[Reviewed Clinical Lab Test(s)] : Results of clinical tests were reviewed. [Reviewed Radiology Report(s)] : Radiology reports were reviewed. [FreeTextEntry1] : She is LESLI. -We discussed the clinical findings and rec for surv.  -Rx for  done and blood drawn in office. Disc its role in surv. -We disc her report of rec'ing immune therapy by a provider closer to home. I re she have info forwarded, incl imaging. She plans to see SH prn.  -We disc bone health, and she says she'll review with her PCP. -We disc BHM, and my rec for her to review this with her PCP. -Her instructions were reviewed. -All Q/A. -She will RTO in , sooner prn.  -Effort for the visit includes the note prep, review of prior material, interview, exam, further documentation, and coordination of care.

## 2025-05-11 NOTE — PHYSICAL EXAM
[MA] : MA [FreeTextEntry2] : Latasha [Absent] : Adnexa(ae): Absent [Normal] : Recto-Vaginal Exam: Normal [de-identified] : [facility]

## 2025-05-11 NOTE — HISTORY OF PRESENT ILLNESS
[FreeTextEntry1] : Serous LMP NORA, BSO, Staging, Left Nephrectomy Referred by Dr. Abel Uro: Dr. Abel GYN Onc: Dr. Isak Jo Onc: Dr. Nelson Ortho: Dr. Sommer PCP: Dr. OLVIN White  She RTO feeling well and noting no VB, VD, or pain. No reported GI/ concerns. Remaining active she notes.   She reports rec'ing immune therapy by a provider closer to home. I have no reports.  BHM: we disc her reviewing this with her PCP.

## 2025-06-29 NOTE — HISTORY OF PRESENT ILLNESS
[de-identified] : Kamala Orellana is a 67-year-old F who presents for evaluation of possible renal cancer discovered on imaging during hospitalization at [facility]. At discharge, she was anemic with HGB=8.3 g/dL.  At time of initial consult, she states she feels unchanged since discharge, with continued fatigue and nausea. Reports that she has Zofran for her nausea, which is providing relief. Denies any pain or further weight loss.  Following up w/ Dr. Wayne next week and will be scheduling an appointment w/ Dr. Isak powell.  Employed as  at [employer].   11/25/24 US Abd: 1. Suspicious LEFT renal, LEFT pelvic and hepatic lesions. Metastatic malignancy suspected. Primary evaluation with CT recommended 2. Nonocclusive IVC thrombus  11/25/24 CT C/A/P: 1. Large superior exophytic mid/upper pole LEFT renal malignancy with nonocclusive LEFT renal vein/IVC thrombus. 2. Hepatic and pulmonary lesions are most suspicious for metastasis 3. Bilateral large ovarian cystic lesions, measure up to 16 cm. Benign ovarian neoplasm cannot be distinguished from malignant ovarian neoplasm 4. Giant hepatic cyst  11/25/24 Transvaginal US:  - Cyst in the midline of the pelvis measuring 16.7 cm with areas of nodularity along the periphery of the cyst and along an internal septation consistent with an ovarian neoplasm. - 13.6 cm in the right adnexa with an internal septation and debris, also consistent with a neoplasm. - Uterus not seen. - A pre and post IV contrast MRI of the pelvis could be obtained for further evaluation.  11/26/24 MR Pelvis: Large bilateral adnexal cystic masses with mural nodularity, suspicious for malignant cystic ovarian neoplasms.  11/29/24 Surgical Pathology: 1) Liver mass core biopsy: - Small, single fragment of highly vascular tissue with numerous thin-walled capillaries, possibly compatible with the clinical suspicion of hemangioma in the clinical setting of a mass and the intraoperative finding of abundant blood on smears. - Separate fragments of benign, unremarkable liver and fibroconnective tissue. - No epithelial malignancy identified in sampled tissue. *Note: Reticulin stain with adequate control shows liver tissue with an intact reticulin network.   s/p Left Radical Nephrectomy and radical hysterectomy with upper cervical excision and right salpingo-oophorectomy on 1/16/25. [de-identified] : 1/16/25 Pathology: 1.  Left adnexal mass, salpingo-oophorectomy - Serous borderline tumor, ovary  - Surface not involved by tumor - Unremarkable fimbriated fallopian tube - AJCC Stage: pT1c3, pNx, pMx  2.  Uterus, upper cervix, right adnexa; hysterectomy with upper cervical excision and right salpingo-oophorectomy - Serous borderline tumor, ovary - Ovarian surface not involved by tumor - Atrophic endometrium - Leiomyoma, uterus - Unremarkable cervix and fimbriated fallopian tube - AJCC Stage: pT1c3, pNx, pMx  3. Adherent appendix, appendectomy - Appendix with fibrous obliteration of lumen - Serosal surface adhesions and focal psammomatous calcification, negative for carcinoma  4. 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  ### Addendum: An immunohistochemical stain for BAP-1 shows retained nuclear expression in neoplastic cells.  5. Left periaortic lymph node, excision - One lymph node, negative for carcinoma (0/1)  6. Left pelvic lymph node package, excision - One lymph node, negative for carcinoma (0/1)  7. 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   Doing well overall and states that she feels great. Continues to follow w/ Dr. Sherwood q3 months for monitoring. Reports that she is tolerating q6 week dosing of pembrolizumab without issue and denies notable s/e. Endorses normal BMs and urination, good energy and stamina, stable BLE edema. Denies dyspnea. Recently followed w/ Dr. White for primary care.

## 2025-06-29 NOTE — REVIEW OF SYSTEMS
[Constipation] : constipation [Diarrhea: Grade 0] : Diarrhea: Grade 0 [Lower Ext Edema] : lower extremity edema [Fatigue] : no fatigue [Recent Change In Weight] : ~T no recent weight change [Nosebleeds] : no nosebleeds [Shortness Of Breath] : no shortness of breath [SOB on Exertion] : no shortness of breath during exertion [Vomiting] : no vomiting [Dysuria] : no dysuria [Skin Rash] : no skin rash [Dizziness] : no dizziness [Fainting] : no fainting [Easy Bleeding] : no tendency for easy bleeding [Easy Bruising] : no tendency for easy bruising [FreeTextEntry5] : stable BLE edema

## 2025-06-29 NOTE — PHYSICAL EXAM
[Fully active, able to carry on all pre-disease performance without restriction] : Status 0 - Fully active, able to carry on all pre-disease performance without restriction [Normal] : affect appropriate [de-identified] : anicteric [de-identified] : well healed surgical wound

## 2025-06-29 NOTE — RESULTS/DATA
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

## 2025-06-29 NOTE — ADDENDUM
[FreeTextEntry1] :  All medical record entries made by the Scribe were at my, Dr. Norbert Nelson, direction and personally dictated by me on 06/27/2025. I have reviewed the chart and agree that the record accurately reflects my personal performance of the history, physical exam, assessment and plan. I have also personally directed, reviewed, and agreed with the chart.   I, Chandu Curtis, documented this note as a scribe on behalf of Dr. Norbert Nelson on 06/27/2025.